# Patient Record
Sex: FEMALE | Race: WHITE
[De-identification: names, ages, dates, MRNs, and addresses within clinical notes are randomized per-mention and may not be internally consistent; named-entity substitution may affect disease eponyms.]

---

## 2021-04-25 ENCOUNTER — HOSPITAL ENCOUNTER (EMERGENCY)
Age: 41
Discharge: HOME | DRG: 639 | End: 2021-04-25
Payer: COMMERCIAL

## 2021-04-25 DIAGNOSIS — I25.2: ICD-10-CM

## 2021-04-25 DIAGNOSIS — I10: ICD-10-CM

## 2021-04-25 DIAGNOSIS — Z86.711: ICD-10-CM

## 2021-04-25 DIAGNOSIS — Z20.822: ICD-10-CM

## 2021-04-25 DIAGNOSIS — J45.909: ICD-10-CM

## 2021-04-25 DIAGNOSIS — E11.65: Primary | ICD-10-CM

## 2021-04-25 DIAGNOSIS — Z79.84: ICD-10-CM

## 2021-04-25 LAB
ALBUMIN SERPL-MCNC: 3.6 G/DL (ref 3.2–5)
ALP SERPL-CCNC: 93 U/L (ref 38–126)
AMYLASE SERPL-CCNC: 39 U/L (ref 30–110)
ANION GAP SERPL CALCULATED.3IONS-SCNC: 9 MMOL/L
AST SERPL-CCNC: 22 U/L (ref 14–36)
BACTERIA #/AREA URNS HPF: (no result) HPF
BASOPHILS NFR BLD AUTO: 1 % (ref 0–3)
BILIRUB UR QL STRIP.AUTO: NEGATIVE
BUN SERPL-MCNC: 4 MG/DL (ref 7–17)
BUN/CREAT SERPL: 8
CHLORIDE SERPL-SCNC: 100 MMOL/L (ref 95–108)
CO2 SERPL-SCNC: 29 MMOL/L (ref 22–30)
COLOR UR AUTO: YELLOW
CREAT SERPL-MCNC: 0.6 MG/DL (ref 0.5–1)
EOSINOPHIL NFR BLD AUTO: 3 % (ref 0–8)
ERYTHROCYTE [DISTWIDTH] IN BLOOD BY AUTOMATED COUNT: 15.4 % (ref 11.5–15.5)
GLUCOSE UR STRIP.AUTO-MCNC: >=1000 MG/DL
HCT VFR BLD AUTO: 38.2 % (ref 37–47)
HGB BLD-MCNC: 12.2 G/DL (ref 12–16)
HGB UR QL STRIP.AUTO: (no result)
IMM GRANULOCYTES NFR BLD: 0.3 % (ref 0–5)
KETONES UR STRIP.AUTO-MCNC: NEGATIVE MG/DL
LEUKOCYTE ESTERASE UR QL STRIP.AUTO: NEGATIVE
LIPASE SERPL-CCNC: 59 U/L (ref 23–300)
LYMPHOCYTES NFR BLD: 25 % (ref 15–41)
MCH RBC QN AUTO: 28.8 PG  CALC (ref 26–32)
MCHC RBC AUTO-ENTMCNC: 31.9 G/DL CAL (ref 32–36)
MCV RBC AUTO: 90.3 FL  CALC (ref 80–100)
MONOCYTES NFR BLD AUTO: 4 % (ref 2–13)
MYOGLOBIN SERPL-MCNC: 20 NG/ML (ref 0–62)
NEUTROPHILS # BLD AUTO: 6.88 THOU/UL (ref 2–7.15)
NEUTROPHILS NFR BLD AUTO: 67 % (ref 42–76)
NITRITE UR QL STRIP.AUTO: NEGATIVE
PH UR STRIP.AUTO: 6.5 [PH] (ref 4.5–8)
PLATELET # BLD AUTO: 323 THOU/UL (ref 130–400)
POTASSIUM SERPL-SCNC: 3.8 MMOL/L (ref 3.5–5.1)
PROT SERPL-MCNC: 6.6 G/DL (ref 6.3–8.2)
PROT UR QL STRIP.AUTO: NEGATIVE MG/DL
RBC # BLD AUTO: 4.23 MILL/UL (ref 4.2–5.6)
RBC #/AREA URNS HPF: (no result) RBC/HPF (ref 0–5)
SODIUM SERPL-SCNC: 134 MMOL/L (ref 137–146)
SP GR UR STRIP.AUTO: 1.01
SQUAMOUS URNS QL MICRO: (no result) EPI/HPF
UROBILINOGEN UR QL STRIP.AUTO: 0.2 E.U./DL
WBC #/AREA URNS HPF: (no result) WBC/HPF (ref 0–5)

## 2021-05-22 ENCOUNTER — HOSPITAL ENCOUNTER (EMERGENCY)
Dept: HOSPITAL 82 - ED | Age: 41
Discharge: HOME | DRG: 313 | End: 2021-05-22
Payer: COMMERCIAL

## 2021-05-22 VITALS — BODY MASS INDEX: 45.07 KG/M2 | HEIGHT: 66 IN | WEIGHT: 280.43 LBS

## 2021-05-22 VITALS — SYSTOLIC BLOOD PRESSURE: 148 MMHG | DIASTOLIC BLOOD PRESSURE: 72 MMHG

## 2021-05-22 DIAGNOSIS — J45.909: ICD-10-CM

## 2021-05-22 DIAGNOSIS — Z20.822: ICD-10-CM

## 2021-05-22 DIAGNOSIS — I10: ICD-10-CM

## 2021-05-22 DIAGNOSIS — R07.89: Primary | ICD-10-CM

## 2021-05-22 DIAGNOSIS — I25.2: ICD-10-CM

## 2021-05-22 DIAGNOSIS — Z86.711: ICD-10-CM

## 2021-05-22 DIAGNOSIS — F17.200: ICD-10-CM

## 2021-05-22 LAB
ALBUMIN SERPL-MCNC: 4 G/DL (ref 3.2–5)
ALP SERPL-CCNC: 103 U/L (ref 38–126)
ANION GAP SERPL CALCULATED.3IONS-SCNC: 11 MMOL/L
APTT PPP: 24.1 SECONDS (ref 20–32.5)
AST SERPL-CCNC: 24 U/L (ref 14–36)
BASOPHILS NFR BLD AUTO: 0 % (ref 0–3)
BILIRUB UR QL STRIP.AUTO: NEGATIVE
BUN SERPL-MCNC: 4 MG/DL (ref 7–17)
BUN/CREAT SERPL: 7
CHLORIDE SERPL-SCNC: 98 MMOL/L (ref 95–108)
CLARITY UR: CLEAR
CO2 SERPL-SCNC: 29 MMOL/L (ref 22–30)
COLOR UR AUTO: YELLOW
CREAT SERPL-MCNC: 0.6 MG/DL (ref 0.5–1)
D DIMER PPP FEU-MCNC: 0.41 MG/L (ref 0.19–0.6)
EOSINOPHIL NFR BLD AUTO: 2 % (ref 0–8)
ERYTHROCYTE [DISTWIDTH] IN BLOOD BY AUTOMATED COUNT: 15.1 % (ref 11.5–15.5)
GLUCOSE UR STRIP.AUTO-MCNC: 100 MG/DL
HCT VFR BLD AUTO: 40.8 % (ref 37–47)
HGB BLD-MCNC: 12.7 G/DL (ref 12–16)
HGB UR QL STRIP.AUTO: NEGATIVE
IMM GRANULOCYTES NFR BLD: 0.7 % (ref 0–5)
INR PPP: 1 RATIO (ref 0.7–1.3)
KETONES UR STRIP.AUTO-MCNC: NEGATIVE MG/DL
LEUKOCYTE ESTERASE UR QL STRIP.AUTO: (no result)
LYMPHOCYTES NFR BLD: 23 % (ref 15–41)
MCH RBC QN AUTO: 27.9 PG  CALC (ref 26–32)
MCHC RBC AUTO-ENTMCNC: 31.1 G/DL CAL (ref 32–36)
MCV RBC AUTO: 89.5 FL  CALC (ref 80–100)
MONOCYTES NFR BLD AUTO: 4 % (ref 2–13)
NEUTROPHILS # BLD AUTO: 8.3 THOU/UL (ref 2–7.15)
NEUTROPHILS NFR BLD AUTO: 70 % (ref 42–76)
NITRITE UR QL STRIP.AUTO: NEGATIVE
PH UR STRIP.AUTO: 6 [PH] (ref 4.5–8)
PLATELET # BLD AUTO: 352 THOU/UL (ref 130–400)
POTASSIUM SERPL-SCNC: 3.5 MMOL/L (ref 3.5–5.1)
PROT SERPL-MCNC: 7.7 G/DL (ref 6.3–8.2)
PROT UR STRIP.AUTO-MCNC: NEGATIVE MG/DL
PROTHROMBIN TIME: 9.7 SECONDS (ref 9–12.5)
RBC # BLD AUTO: 4.56 MILL/UL (ref 4.2–5.6)
SODIUM SERPL-SCNC: 135 MMOL/L (ref 137–146)
SP GR UR STRIP.AUTO: 1.01
UROBILINOGEN UR QL STRIP.AUTO: 0.2 E.U./DL

## 2021-07-15 ENCOUNTER — HOSPITAL ENCOUNTER (EMERGENCY)
Dept: HOSPITAL 82 - ED | Age: 41
Discharge: HOME | DRG: 178 | End: 2021-07-15
Payer: COMMERCIAL

## 2021-07-15 VITALS — SYSTOLIC BLOOD PRESSURE: 155 MMHG | DIASTOLIC BLOOD PRESSURE: 79 MMHG

## 2021-07-15 VITALS — BODY MASS INDEX: 35.43 KG/M2 | HEIGHT: 66 IN | WEIGHT: 220.46 LBS

## 2021-07-15 DIAGNOSIS — Z86.711: ICD-10-CM

## 2021-07-15 DIAGNOSIS — J45.901: ICD-10-CM

## 2021-07-15 DIAGNOSIS — F17.210: ICD-10-CM

## 2021-07-15 DIAGNOSIS — U07.1: Primary | ICD-10-CM

## 2021-07-15 LAB
BASOPHILS NFR BLD AUTO: 0 % (ref 0–3)
EOSINOPHIL NFR BLD AUTO: 1 % (ref 0–8)
ERYTHROCYTE [DISTWIDTH] IN BLOOD BY AUTOMATED COUNT: 16 % (ref 11.5–15.5)
HCT VFR BLD AUTO: 36.6 % (ref 37–47)
HGB BLD-MCNC: 10.9 G/DL (ref 12–16)
IMM GRANULOCYTES NFR BLD: 0.5 % (ref 0–5)
LYMPHOCYTES NFR BLD: 26 % (ref 15–41)
MCH RBC QN AUTO: 26.5 PG  CALC (ref 26–32)
MCHC RBC AUTO-ENTMCNC: 29.8 G/DL CAL (ref 32–36)
MCV RBC AUTO: 89.1 FL  CALC (ref 80–100)
MONOCYTES NFR BLD AUTO: 5 % (ref 2–13)
NEUTROPHILS # BLD AUTO: 5.57 THOU/UL (ref 2–7.15)
NEUTROPHILS NFR BLD AUTO: 66 % (ref 42–76)
PLATELET # BLD AUTO: 258 THOU/UL (ref 130–400)
RBC # BLD AUTO: 4.11 MILL/UL (ref 4.2–5.6)

## 2021-07-19 ENCOUNTER — HOSPITAL ENCOUNTER (OUTPATIENT)
Dept: HOSPITAL 82 - MS2 | Age: 41
Setting detail: OBSERVATION
LOS: 1 days | Discharge: HOME | DRG: 203 | End: 2021-07-20
Attending: INTERNAL MEDICINE | Admitting: INTERNAL MEDICINE
Payer: COMMERCIAL

## 2021-07-19 VITALS — DIASTOLIC BLOOD PRESSURE: 64 MMHG | SYSTOLIC BLOOD PRESSURE: 119 MMHG

## 2021-07-19 VITALS — HEIGHT: 66 IN | BODY MASS INDEX: 46.61 KG/M2 | WEIGHT: 290 LBS

## 2021-07-19 DIAGNOSIS — Z86.711: ICD-10-CM

## 2021-07-19 DIAGNOSIS — I10: ICD-10-CM

## 2021-07-19 DIAGNOSIS — E11.65: ICD-10-CM

## 2021-07-19 DIAGNOSIS — J45.909: Primary | ICD-10-CM

## 2021-07-19 DIAGNOSIS — F17.200: ICD-10-CM

## 2021-07-19 DIAGNOSIS — I25.2: ICD-10-CM

## 2021-07-19 DIAGNOSIS — Z20.822: ICD-10-CM

## 2021-07-19 DIAGNOSIS — M32.9: ICD-10-CM

## 2021-07-19 LAB
ALBUMIN SERPL-MCNC: 3.6 G/DL (ref 3.2–5)
ALP SERPL-CCNC: 78 U/L (ref 38–126)
ANION GAP SERPL CALCULATED.3IONS-SCNC: 12 MMOL/L
AST SERPL-CCNC: 13 U/L (ref 14–36)
BASOPHILS NFR BLD AUTO: 0 % (ref 0–3)
BUN SERPL-MCNC: 13 MG/DL (ref 7–17)
BUN/CREAT SERPL: 22
CHLORIDE SERPL-SCNC: 99 MMOL/L (ref 95–108)
CO2 SERPL-SCNC: 28 MMOL/L (ref 22–30)
CREAT SERPL-MCNC: 0.6 MG/DL (ref 0.5–1)
CRP SERPL-MCNC: < 0.5 MG/DL (ref 0–0.9)
EOSINOPHIL NFR BLD AUTO: 0 % (ref 0–8)
ERYTHROCYTE [DISTWIDTH] IN BLOOD BY AUTOMATED COUNT: 15.9 % (ref 11.5–15.5)
HCT VFR BLD AUTO: 36.9 % (ref 37–47)
HGB BLD-MCNC: 11.4 G/DL (ref 12–16)
IMM GRANULOCYTES NFR BLD: 0.6 % (ref 0–5)
LYMPHOCYTES NFR BLD: 12 % (ref 15–41)
MCH RBC QN AUTO: 26.5 PG  CALC (ref 26–32)
MCHC RBC AUTO-ENTMCNC: 30.9 G/DL CAL (ref 32–36)
MCV RBC AUTO: 85.6 FL  CALC (ref 80–100)
MONOCYTES NFR BLD AUTO: 6 % (ref 2–13)
NEUTROPHILS # BLD AUTO: 11.28 THOU/UL (ref 2–7.15)
NEUTROPHILS NFR BLD AUTO: 81 % (ref 42–76)
PLATELET # BLD AUTO: 371 THOU/UL (ref 130–400)
POTASSIUM SERPL-SCNC: 4.1 MMOL/L (ref 3.5–5.1)
PROT SERPL-MCNC: 6.8 G/DL (ref 6.3–8.2)
RBC # BLD AUTO: 4.31 MILL/UL (ref 4.2–5.6)
SODIUM SERPL-SCNC: 135 MMOL/L (ref 137–146)

## 2021-07-19 PROCEDURE — G0378 HOSPITAL OBSERVATION PER HR: HCPCS

## 2021-07-19 NOTE — NUR
PHYSICAL ASSESMENT COMPLETE. PT CURRENTLY DENIES PAIN OR DISCOMFORT.
SCHEDULED MEDICATIONS AND PRN MEDICATION ADMINISTERED, SEE E-MAR. PT DOES HAVE
SOME CHEST CONSOLDATION AND HAS A NON-PRODUCTIVE COUGH.  PT DENIES
ANY NEEDS AT THIS TIME.  PLAN OF CARE REVIEWED, PT DENIES QUESTIONS,
VERBALIZES UNDERSTANDING. ITEMS WITHIN REACH, BED LOCKED IN LOW POSITION W/
BEDRAILS UP X2. CALL BELL WITHIN REACH, AGREES TO CALL PRN.

## 2021-07-19 NOTE — NUR
pt arrived as a direct admit from Dr. Gomes's office. a&o x4. Pt c/o exertional
sob, increasing over the past couple days. admits to smoking one pack a day.
o2 currently via room air 93-94%, no supplemental oxygen needed at this time.
#20G rac initiated x1 attmept by this writer. wheezing heard upon expiration.
flushed appearance noted. Bm reported 07/18/21. active bowel sounds x4
quadrants. telemetry monitor placed by this writer, SR 72.  skin intact. weak
pedal pulses. med rec completed at this time. pt denies any pain at this time.
assessment completed. discussed poc. oriented pt to room. Covid sample
obtained from both nares, pt tested positive in the ED on the 15th. isolation
precautions in place. call light within reach.

## 2021-07-20 VITALS — SYSTOLIC BLOOD PRESSURE: 116 MMHG | DIASTOLIC BLOOD PRESSURE: 60 MMHG

## 2021-07-20 VITALS — DIASTOLIC BLOOD PRESSURE: 65 MMHG | SYSTOLIC BLOOD PRESSURE: 108 MMHG

## 2021-07-20 VITALS — DIASTOLIC BLOOD PRESSURE: 74 MMHG | SYSTOLIC BLOOD PRESSURE: 110 MMHG

## 2021-07-20 VITALS — DIASTOLIC BLOOD PRESSURE: 77 MMHG | SYSTOLIC BLOOD PRESSURE: 130 MMHG

## 2021-07-20 LAB
ANION GAP SERPL CALCULATED.3IONS-SCNC: 15 MMOL/L
BUN SERPL-MCNC: 14 MG/DL (ref 7–17)
BUN/CREAT SERPL: 22
CHLORIDE SERPL-SCNC: 98 MMOL/L (ref 95–108)
CHOLEST SERPL-MCNC: 161 MG/DL (ref 0–199)
CHOLEST/HDLC SERPL: 2.8 {RATIO}
CO2 SERPL-SCNC: 27 MMOL/L (ref 22–30)
CREAT SERPL-MCNC: 0.6 MG/DL (ref 0.5–1)
ERYTHROCYTE [DISTWIDTH] IN BLOOD BY AUTOMATED COUNT: 15.9 % (ref 11.5–15.5)
HCT VFR BLD AUTO: 38 % (ref 37–47)
HDLC SERPL-MCNC: 58 MG/DL (ref 40–?)
HGB BLD-MCNC: 11.8 G/DL (ref 12–16)
LDLC SERPL CALC-MCNC: 77 MG/DL
MAGNESIUM SERPL-MCNC: 1.6 MG/DL (ref 1.6–2.3)
MCH RBC QN AUTO: 26.5 PG  CALC (ref 26–32)
MCHC RBC AUTO-ENTMCNC: 31.1 G/DL CAL (ref 32–36)
MCV RBC AUTO: 85.4 FL  CALC (ref 80–100)
PLATELET # BLD AUTO: 370 THOU/UL (ref 130–400)
POTASSIUM SERPL-SCNC: 4.1 MMOL/L (ref 3.5–5.1)
RBC # BLD AUTO: 4.45 MILL/UL (ref 4.2–5.6)
SODIUM SERPL-SCNC: 136 MMOL/L (ref 137–146)
TRIGL SERPL-MCNC: 129 MG/DL (ref 30–149)
VLDLC SERPL CALC-MCNC: 26 MG/DL

## 2021-07-20 NOTE — NUR
PT LAYING IN BED WITH EYES CLOSED, APPEARS TO BE SLEEPING, APPEARS COMFORTABLE
AND IN NO DISTRESS. RESPIRATIONS REGULAR AND UNLABORED. PT RECEIVING HER
0000 ANTIBIOTICS ITEMS REMAIN WITHIN REACH, CALL MARK REMAINS WITHIN REACH.
BED REMAINS LOCKED AND IN LOW POSITION WITH BEDRAILS UP X2. WILL CONTINUE TO
MONITOR.

## 2021-07-20 NOTE — NUR
Discharge instructions given. Patient verbalizes understanding of same.
Discharged in stable condition ambulatory to home with accompanied by
staff. All belongings sent with pt.

## 2021-07-20 NOTE — NUR
PT SITTING IN BED. A&O X4. NO DISTRESS NOTED. PT REMAINS VIA ROOM AIR, NO
SUPPLEMENTAL O2 NEEDED AT THIS TIME. PT REPORTS TO BE FEELING BETTER THIS
MORNING. SLIGHT EXPIRATORY WHEEZING HEARD BILTATERAL LOWER LUNGS. BREATH
SOUNDS IMPORVED SINCE YESTERDAY. ACTIVE BOWEL SOUNDS X4 QUADRANTS. TRACE EDEMA
NOTED TO BILATERAL ANKLES. NO OTHER NEEDS AT THIS TIME. CALL LIGHT WITHIN
REACH. ASSESSMENT COMPLETED. DISCUSSED POC

## 2021-11-11 ENCOUNTER — HOSPITAL ENCOUNTER (EMERGENCY)
Dept: HOSPITAL 82 - ED | Age: 41
LOS: 1 days | Discharge: HOME | DRG: 101 | End: 2021-11-12
Payer: COMMERCIAL

## 2021-11-11 VITALS — WEIGHT: 293 LBS | HEIGHT: 66 IN | BODY MASS INDEX: 47.09 KG/M2

## 2021-11-11 DIAGNOSIS — Z79.84: ICD-10-CM

## 2021-11-11 DIAGNOSIS — Z86.711: ICD-10-CM

## 2021-11-11 DIAGNOSIS — R56.9: Primary | ICD-10-CM

## 2021-11-11 DIAGNOSIS — F17.200: ICD-10-CM

## 2021-11-11 DIAGNOSIS — J45.909: ICD-10-CM

## 2021-11-11 DIAGNOSIS — I10: ICD-10-CM

## 2021-11-11 DIAGNOSIS — Z86.16: ICD-10-CM

## 2021-11-11 DIAGNOSIS — R07.9: ICD-10-CM

## 2021-11-11 DIAGNOSIS — E11.9: ICD-10-CM

## 2021-11-11 DIAGNOSIS — Z20.822: ICD-10-CM

## 2021-11-11 DIAGNOSIS — Z79.01: ICD-10-CM

## 2021-11-11 LAB
ALBUMIN SERPL-MCNC: 3.7 G/DL (ref 3.2–5)
ALP SERPL-CCNC: 94 U/L (ref 38–126)
ANION GAP SERPL CALCULATED.3IONS-SCNC: 8 MMOL/L
AST SERPL-CCNC: 16 U/L (ref 14–36)
BACTERIA #/AREA URNS HPF: (no result) HPF
BASOPHILS NFR BLD AUTO: 0 % (ref 0–3)
BILIRUB UR QL STRIP.AUTO: NEGATIVE
BUN SERPL-MCNC: 7 MG/DL (ref 7–17)
BUN/CREAT SERPL: 9
CHLORIDE SERPL-SCNC: 104 MMOL/L (ref 95–108)
CO2 SERPL-SCNC: 30 MMOL/L (ref 22–30)
COLOR UR AUTO: YELLOW
CREAT SERPL-MCNC: 0.7 MG/DL (ref 0.5–1)
EOSINOPHIL NFR BLD AUTO: 2 % (ref 0–8)
EPI CELLS URNS QL MICRO: (no result) EPI/HPF
ERYTHROCYTE [DISTWIDTH] IN BLOOD BY AUTOMATED COUNT: 17.8 % (ref 11.5–15.5)
GLUCOSE UR STRIP.AUTO-MCNC: NEGATIVE MG/DL
HCT VFR BLD AUTO: 36.8 % (ref 37–47)
HGB BLD-MCNC: 11.3 G/DL (ref 12–16)
HGB UR QL STRIP.AUTO: (no result)
IMM GRANULOCYTES NFR BLD: 0.4 % (ref 0–5)
KETONES UR STRIP.AUTO-MCNC: NEGATIVE MG/DL
LEUKOCYTE ESTERASE UR QL STRIP.AUTO: NEGATIVE
LYMPHOCYTES NFR BLD: 28 % (ref 15–41)
MCH RBC QN AUTO: 25.2 PG  CALC (ref 26–32)
MCHC RBC AUTO-ENTMCNC: 30.7 G/DL CAL (ref 32–36)
MCV RBC AUTO: 82 FL  CALC (ref 80–100)
MONOCYTES NFR BLD AUTO: 5 % (ref 2–13)
MYOGLOBIN SERPL-MCNC: 20 NG/ML (ref 0–62)
NEUTROPHILS # BLD AUTO: 7.73 THOU/UL (ref 2–7.15)
NEUTROPHILS NFR BLD AUTO: 64 % (ref 42–76)
NITRITE UR QL STRIP.AUTO: NEGATIVE
PH UR STRIP.AUTO: 7 [PH] (ref 4.5–8)
PLATELET # BLD AUTO: 296 THOU/UL (ref 130–400)
POTASSIUM SERPL-SCNC: 4.1 MMOL/L (ref 3.5–5.1)
PROT SERPL-MCNC: 6.9 G/DL (ref 6.3–8.2)
PROT UR QL STRIP.AUTO: NEGATIVE MG/DL
RBC # BLD AUTO: 4.49 MILL/UL (ref 4.2–5.6)
RBC #/AREA URNS HPF: >100 RBC/HPF (ref 0–5)
SODIUM SERPL-SCNC: 138 MMOL/L (ref 137–146)
SP GR UR STRIP.AUTO: 1.01
UROBILINOGEN UR QL STRIP.AUTO: 0.2 E.U./DL
WBC #/AREA URNS HPF: (no result) WBC/HPF (ref 0–5)

## 2021-11-12 VITALS — SYSTOLIC BLOOD PRESSURE: 125 MMHG | DIASTOLIC BLOOD PRESSURE: 66 MMHG

## 2021-11-12 LAB
INR PPP: 0.9 RATIO (ref 0.7–1.3)
PROTHROMBIN TIME: 9.9 SECONDS (ref 9–12.5)

## 2022-02-10 ENCOUNTER — HOSPITAL ENCOUNTER (EMERGENCY)
Dept: HOSPITAL 82 - ED | Age: 42
Discharge: LEFT BEFORE DISCHARGE | DRG: 951 | End: 2022-02-10
Payer: COMMERCIAL

## 2022-02-10 DIAGNOSIS — Z53.21: Primary | ICD-10-CM
